# Patient Record
Sex: FEMALE | Race: WHITE
[De-identification: names, ages, dates, MRNs, and addresses within clinical notes are randomized per-mention and may not be internally consistent; named-entity substitution may affect disease eponyms.]

---

## 2018-07-14 ENCOUNTER — HOSPITAL ENCOUNTER (EMERGENCY)
Dept: HOSPITAL 68 - ERH | Age: 58
End: 2018-07-14
Payer: COMMERCIAL

## 2018-07-14 VITALS — DIASTOLIC BLOOD PRESSURE: 66 MMHG | SYSTOLIC BLOOD PRESSURE: 159 MMHG

## 2018-07-14 VITALS — HEIGHT: 62 IN | WEIGHT: 136 LBS | BODY MASS INDEX: 25.03 KG/M2
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
## 2018-07-14 DIAGNOSIS — S92.512A: Primary | ICD-10-CM
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
## 2018-07-14 NOTE — RADIOLOGY REPORT
EXAMINATION:
XR TOES, LEFT
 
CLINICAL INFORMATION:
Toe pain
 
COMPARISON:
None
 
TECHNIQUE:
3 views of the left toes were obtained.
 
FINDINGS:
There is a spiral fracture involving proximal phalanx of the fifth toe
extending through the diaphysis into the metaphysis. The fracture does not
involve the joint space. There is minimal lateral deviation distal fracture
fragment.
 
IMPRESSION:
Fracture of proximal phalanx.

## 2018-07-14 NOTE — ED ANKLE/FOOT INJURY COMPLAINT
History of Present Illness
 
General
Chief Complaint: Foot or Ankle Injury
Stated Complaint: LEFT LITTLE TOE PAIN
Source: patient, family
Exam Limitations: language barrier
 
Vital Signs & Intake/Output
Vital Signs & Intake/Output
 Vital Signs
 
 
Date Time Temp Pulse Resp B/P B/P Pulse O2 O2 Flow FiO2
 
     Mean Ox Delivery Rate 
 
 2132 98.8 81 16 159/66  97 Room Air  
 
 
 
Allergies
Coded Allergies:
MDX - Cat Hair Extract (CAT HAIR EXTRACT) (ITCHING,  13)
MDX - Ibuprofen (From [employer]) (SHAKING 14)
MDX - Lactose (LACTOSE) (GI UPSET 13)
shellfish derived ("SWOLLEN" PER PT 18)
 
Reconcile Medications
Amoxicillin 500 MG TABLET   1 TAB PO TID sinusitis
Cholecalciferol (Vitamin D3) (Vitamin D3) 1,000 UNIT CAPSULE   1 CAP PO DAILY 
VITAMIN SUPPORT  (Reported)
Cyanocobalamin (Vitamin B-12) 1,000 MCG TABLET   1 TAB PO DAILY VITAMIN SUPPORT 
(Reported)
DOXYCYCLINE MONOHYDRATE (Doxycycline Monohydrate) 100 MG CAPSULE   100 MG PO BID
TICK BITE
Lorazepam 1 MG TABLET   1 TAB PO DAILY ANXIETY  (Reported)
Lorazepam 1 MG TABLET   1 TAB PO TID ANXIETY  (Reported)
Oxymetazoline HCl (Afrin) 0.05 % SPRAY   2 SPRAY NASB BID sinusitis
Prednisone 20 MG TABLET   1 TAB PO BID sinusitis, allergy
SERTRALINE HCL (Sertraline Hydrochloride) 50 MG TABLET   1 TAB PO DAILY UNK  (
Reported)
Sertraline HCl 100 MG TABLET   1 TAB PO DAILY DEPRESSION  (Reported)
 
Triage Note:
REQUESTING EVALUATION OF LEFT PINKY TOE. SHE
 ACCIDENTALLY KICKED A PIECE OF FURNITURE AND NOW
 REPORTS PAIN, NO SWELLING OR DEFORMITY NOTED.
Triage Nurses Notes Reviewed? yes
HPI:
58F no significant PMH presents with left 5th toe pain after stubbing it on a 
chair a few hours ago.  She is able to walk, but only complaints of pain to the 
toe.  She has sensation in the toe and is able to move it.  She has no other 
complaints.
 
Past History
 
Travel History
Traveled to [place] past 21 day No
 
Medical History
Any Pertinent Medical History? see below for history
Psychiatric: anxiety
 
Surgical History
Surgical History: non-contributory
 
Psychosocial History
Who do you live with Spouse
What is your primary language Polish
Tobacco Use: Current Daily Use
Daily Tobacco Use Amount/Type: => 5 Cigarettes daily
 
Family History
Hx Contributory? No
 
Review of Systems
 
Review of Systems
Constitutional:
Reports: no symptoms. 
EENTM:
Reports: no symptoms. 
Respiratory:
Reports: no symptoms. 
Cardiovascular:
Reports: no symptoms. 
GI:
Reports: no symptoms. 
Genitourinary:
Reports: no symptoms. 
Musculoskeletal:
Reports: no symptoms. 
Skin:
Reports: no symptoms. 
Neurological/Psychological:
Reports: no symptoms. 
Hematologic/Endocrine:
Reports: no symptoms. 
Immunologic/Allergic:
Reports: no symptoms. 
All Other Systems: Reviewed and Negative
 
Physical Exam
 
Physical Exam
General Appearance: well developed/nourished, no apparent distress
Head: atraumatic, normal appearance
Eyes:
Bilateral: normal appearance. 
Ears, Nose, Throat: hearing grossly normal
Neck: normal inspection, supple
Cardiovascular/Respiratory: normal breath sounds, normal peripheral pulses
Back: normal inspection, normal range of motion
Leg/Knee/Thigh Left: normal range of motion, normal inspection
Leg/Knee/Thigh Right: normal range of motion, normal inspection
Ankle Left: normal inspection, normal range of motion
Ankle Right: normal inspection, normal range of motion
Foot Left: normal inspection, normal range of motion, tenderness to fifth toe, 
no broken skin, no swelling
Foot Right: normal inspection, normal range of motion
 
Progress
Differential Diagnosis: gout, fracture, dislocation, sprain, contusion
Plan of Care:
 Orders
 
 
Procedure Date/time Status
 
Durable Medical Equipment 2303 Active
 
 
Will tape toe and place in boot.  Discharge with orthopedic follow up.
Diagnostic Imaging:
Viewed by Me: Radiology Read.  Discussed w/RAD: Radiology Read. 
Radiology Impression: PATIENT: GWENDOLYN ABBASI  MEDICAL RECORD NO: [id number] 
PRESENT AGE: 58  PATIENT ACCOUNT NO: [id number] : 62  LOCATION: [facility] 
ORDERING PHYSICIAN: Bradley Vera DO (TBS)     SERVICE DATE:  EXAM 
TYPE: RAD - XRY-TOES, LEFT EXAMINATION: XR TOES, LEFT CLINICAL INFORMATION: Toe 
pain COMPARISON: None TECHNIQUE: 3 views of the left toes were obtained. 
FINDINGS: There is a spiral fracture involving proximal phalanx of the fifth toe
extending through the diaphysis into the metaphysis. The fracture does not 
involve the joint space. There is minimal lateral deviation distal fracture 
fragment. IMPRESSION: Fracture of proximal phalanx. DICTATED BY: Bang Carranza MD  DATE/TIME DICTATED:18 :RAD.BOWMAN  DATE/TIME 
TRANSCRIBED:18 CONFIDENTIAL, DO NOT COPY WITHOUT APPROPRIATE 
AUTHORIZATION.  <Electronically signed in Other Vendor System>                  
                                                                     SIGNED BY: 
Bang Carranza MD 18
 
Departure
 
Departure
Disposition: HOME OR SELF CARE
Condition: Stable
Clinical Impression
Primary Impression: Fracture of fifth toe, left, closed
Qualifiers:  Encounter type: subsequent encounter Fracture healing: with routine
healing Qualified Code: S92.502D - Displaced unspecified fracture of left lesser
toe(s), subsequent encounter for fracture with routine healing
Referrals:
Naveed UHERTA,Oracio Guerrero (PCP/Family)
 
Additional Instructions:
Follow up with orthopedist.  Return to ER if any new or worsening symptoms.  
Motrin/Tylenol for pain.
Departure Forms:
Customer Survey
General Discharge Information